# Patient Record
Sex: MALE | Race: BLACK OR AFRICAN AMERICAN | NOT HISPANIC OR LATINO
[De-identification: names, ages, dates, MRNs, and addresses within clinical notes are randomized per-mention and may not be internally consistent; named-entity substitution may affect disease eponyms.]

---

## 2017-08-10 ENCOUNTER — APPOINTMENT (OUTPATIENT)
Dept: POPULATION HEALTH | Facility: CLINIC | Age: 37
End: 2017-08-10
Payer: OTHER MISCELLANEOUS

## 2017-08-10 VITALS
HEIGHT: 73.5 IN | TEMPERATURE: 98.9 F | DIASTOLIC BLOOD PRESSURE: 93 MMHG | SYSTOLIC BLOOD PRESSURE: 126 MMHG | RESPIRATION RATE: 19 BRPM | OXYGEN SATURATION: 96 % | WEIGHT: 275 LBS | BODY MASS INDEX: 35.67 KG/M2 | HEART RATE: 100 BPM

## 2017-08-10 DIAGNOSIS — R40.0 SOMNOLENCE: ICD-10-CM

## 2017-08-10 DIAGNOSIS — Z78.9 OTHER SPECIFIED HEALTH STATUS: ICD-10-CM

## 2017-08-10 DIAGNOSIS — M25.50 PAIN IN UNSPECIFIED JOINT: ICD-10-CM

## 2017-08-10 DIAGNOSIS — F32.9 MAJOR DEPRESSIVE DISORDER, SINGLE EPISODE, UNSPECIFIED: ICD-10-CM

## 2017-08-10 DIAGNOSIS — G47.9 SLEEP DISORDER, UNSPECIFIED: ICD-10-CM

## 2017-08-10 DIAGNOSIS — Z82.61 FAMILY HISTORY OF ARTHRITIS: ICD-10-CM

## 2017-08-10 PROBLEM — Z00.00 ENCOUNTER FOR PREVENTIVE HEALTH EXAMINATION: Status: ACTIVE | Noted: 2017-08-10

## 2017-08-10 PROCEDURE — 99205 OFFICE O/P NEW HI 60 MIN: CPT

## 2017-08-10 RX ORDER — KETOCONAZOLE 20 MG/G
2 CREAM TOPICAL
Qty: 120 | Refills: 0 | Status: ACTIVE | COMMUNITY
Start: 2017-04-10

## 2017-08-25 ENCOUNTER — TRANSCRIPTION ENCOUNTER (OUTPATIENT)
Age: 37
End: 2017-08-25

## 2017-10-12 ENCOUNTER — APPOINTMENT (OUTPATIENT)
Dept: POPULATION HEALTH | Facility: CLINIC | Age: 37
End: 2017-10-12
Payer: OTHER MISCELLANEOUS

## 2017-10-12 VITALS
BODY MASS INDEX: 35.15 KG/M2 | HEIGHT: 73.5 IN | RESPIRATION RATE: 20 BRPM | SYSTOLIC BLOOD PRESSURE: 134 MMHG | DIASTOLIC BLOOD PRESSURE: 99 MMHG | HEART RATE: 50 BPM | OXYGEN SATURATION: 96 % | WEIGHT: 271 LBS | TEMPERATURE: 98.9 F

## 2017-10-12 DIAGNOSIS — M75.112 INCOMPLETE ROTATOR CUFF TEAR OR RUPTURE OF LEFT SHOULDER, NOT SPECIFIED AS TRAUMATIC: ICD-10-CM

## 2017-10-12 PROCEDURE — 99214 OFFICE O/P EST MOD 30 MIN: CPT

## 2017-12-07 ENCOUNTER — APPOINTMENT (OUTPATIENT)
Dept: POPULATION HEALTH | Facility: CLINIC | Age: 37
End: 2017-12-07
Payer: OTHER MISCELLANEOUS

## 2017-12-07 VITALS
HEIGHT: 73.5 IN | SYSTOLIC BLOOD PRESSURE: 130 MMHG | TEMPERATURE: 98.2 F | WEIGHT: 272 LBS | OXYGEN SATURATION: 97 % | BODY MASS INDEX: 35.28 KG/M2 | HEART RATE: 85 BPM | RESPIRATION RATE: 18 BRPM | DIASTOLIC BLOOD PRESSURE: 87 MMHG

## 2017-12-07 PROCEDURE — 99214 OFFICE O/P EST MOD 30 MIN: CPT

## 2018-01-03 ENCOUNTER — EMERGENCY (EMERGENCY)
Facility: HOSPITAL | Age: 38
LOS: 1 days | Discharge: HOME | End: 2018-01-03
Attending: EMERGENCY MEDICINE | Admitting: EMERGENCY MEDICINE
Payer: SELF-PAY

## 2018-01-03 VITALS
OXYGEN SATURATION: 97 % | DIASTOLIC BLOOD PRESSURE: 78 MMHG | HEART RATE: 76 BPM | RESPIRATION RATE: 20 BRPM | TEMPERATURE: 98 F | SYSTOLIC BLOOD PRESSURE: 132 MMHG

## 2018-01-03 VITALS
HEART RATE: 102 BPM | DIASTOLIC BLOOD PRESSURE: 93 MMHG | OXYGEN SATURATION: 96 % | TEMPERATURE: 98 F | SYSTOLIC BLOOD PRESSURE: 152 MMHG | RESPIRATION RATE: 20 BRPM

## 2018-01-03 DIAGNOSIS — M79.604 PAIN IN RIGHT LEG: ICD-10-CM

## 2018-01-03 DIAGNOSIS — M54.2 CERVICALGIA: ICD-10-CM
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
## 2018-01-03 DIAGNOSIS — R51 HEADACHE: ICD-10-CM
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
## 2018-01-03 PROCEDURE — 71045 X-RAY EXAM CHEST 1 VIEW: CPT | Mod: 26

## 2018-01-03 PROCEDURE — 73610 X-RAY EXAM OF ANKLE: CPT | Mod: 26,RT

## 2018-01-03 PROCEDURE — 71045 X-RAY EXAM CHEST 1 VIEW: CPT

## 2018-01-03 PROCEDURE — 72125 CT NECK SPINE W/O DYE: CPT

## 2018-01-03 PROCEDURE — 99285 EMERGENCY DEPT VISIT HI MDM: CPT

## 2018-01-03 PROCEDURE — 73110 X-RAY EXAM OF WRIST: CPT

## 2018-01-03 PROCEDURE — 72125 CT NECK SPINE W/O DYE: CPT | Mod: 26

## 2018-01-03 PROCEDURE — 70450 CT HEAD/BRAIN W/O DYE: CPT

## 2018-01-03 PROCEDURE — 73590 X-RAY EXAM OF LOWER LEG: CPT

## 2018-01-03 PROCEDURE — 73130 X-RAY EXAM OF HAND: CPT

## 2018-01-03 PROCEDURE — 73080 X-RAY EXAM OF ELBOW: CPT

## 2018-01-03 PROCEDURE — 73110 X-RAY EXAM OF WRIST: CPT | Mod: 26,RT

## 2018-01-03 PROCEDURE — 73562 X-RAY EXAM OF KNEE 3: CPT

## 2018-01-03 PROCEDURE — 73562 X-RAY EXAM OF KNEE 3: CPT | Mod: 26,RT

## 2018-01-03 PROCEDURE — 73590 X-RAY EXAM OF LOWER LEG: CPT | Mod: 26,RT

## 2018-01-03 PROCEDURE — 70450 CT HEAD/BRAIN W/O DYE: CPT | Mod: 26

## 2018-01-03 PROCEDURE — 72170 X-RAY EXAM OF PELVIS: CPT | Mod: 26

## 2018-01-03 PROCEDURE — 73080 X-RAY EXAM OF ELBOW: CPT | Mod: 26,RT

## 2018-01-03 PROCEDURE — 99284 EMERGENCY DEPT VISIT MOD MDM: CPT | Mod: 25

## 2018-01-03 PROCEDURE — 73610 X-RAY EXAM OF ANKLE: CPT

## 2018-01-03 PROCEDURE — 73130 X-RAY EXAM OF HAND: CPT | Mod: 26,RT

## 2018-01-03 PROCEDURE — 72170 X-RAY EXAM OF PELVIS: CPT

## 2018-01-03 RX ORDER — ACETAMINOPHEN 500 MG
650 TABLET ORAL ONCE
Refills: 0 | Status: COMPLETED | OUTPATIENT
Start: 2018-01-03 | End: 2018-01-03

## 2018-01-03 RX ADMIN — Medication 650 MILLIGRAM(S): at 09:11
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
## 2018-01-03 NOTE — ED PROVIDER NOTE - PLAN OF CARE
Your x-rays and CAT scans show no internal injuries.    Return for worsening pain or any other concerns    You may use Tylenol 650mg every 8 hours or Motrin 600mg every 8 hours as needed for pain.
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
## 2018-01-03 NOTE — ED PROVIDER NOTE - ATTENDING CONTRIBUTION TO CARE
Attending MD Sierra:  I personally have seen and examined this patient.  Resident note reviewed and agree on plan of care and except where noted.  See HPI, PE, and MDM for details.
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
## 2018-03-12 ENCOUNTER — APPOINTMENT (OUTPATIENT)
Dept: POPULATION HEALTH | Facility: CLINIC | Age: 38
End: 2018-03-12

## 2023-04-04 ENCOUNTER — EMERGENCY (EMERGENCY)
Facility: HOSPITAL | Age: 43
LOS: 1 days | Discharge: HOME | End: 2023-04-04
Attending: EMERGENCY MEDICINE
Payer: COMMERCIAL

## 2023-04-04 VITALS
HEART RATE: 86 BPM | WEIGHT: 300.05 LBS | DIASTOLIC BLOOD PRESSURE: 111 MMHG | HEIGHT: 75 IN | RESPIRATION RATE: 20 BRPM | OXYGEN SATURATION: 96 % | TEMPERATURE: 98 F | SYSTOLIC BLOOD PRESSURE: 155 MMHG

## 2023-04-04 VITALS
HEART RATE: 76 BPM | TEMPERATURE: 98 F | DIASTOLIC BLOOD PRESSURE: 96 MMHG | OXYGEN SATURATION: 100 % | SYSTOLIC BLOOD PRESSURE: 137 MMHG | RESPIRATION RATE: 17 BRPM

## 2023-04-04 LAB
ALBUMIN SERPL ELPH-MCNC: 4.1 G/DL — SIGNIFICANT CHANGE UP (ref 3.3–5)
ALP SERPL-CCNC: 66 U/L — SIGNIFICANT CHANGE UP (ref 40–120)
ALT FLD-CCNC: 24 U/L — SIGNIFICANT CHANGE UP (ref 10–45)
ANION GAP SERPL CALC-SCNC: 10 MMOL/L — SIGNIFICANT CHANGE UP (ref 5–17)
AST SERPL-CCNC: 17 U/L — SIGNIFICANT CHANGE UP (ref 10–40)
BASOPHILS # BLD AUTO: 0.01 K/UL — SIGNIFICANT CHANGE UP (ref 0–0.2)
BASOPHILS NFR BLD AUTO: 0.1 % — SIGNIFICANT CHANGE UP (ref 0–2)
BILIRUB SERPL-MCNC: 0.1 MG/DL — LOW (ref 0.2–1.2)
BUN SERPL-MCNC: 16 MG/DL — SIGNIFICANT CHANGE UP (ref 7–23)
CALCIUM SERPL-MCNC: 9 MG/DL — SIGNIFICANT CHANGE UP (ref 8.4–10.5)
CHLORIDE SERPL-SCNC: 106 MMOL/L — SIGNIFICANT CHANGE UP (ref 96–108)
CO2 SERPL-SCNC: 25 MMOL/L — SIGNIFICANT CHANGE UP (ref 22–31)
CREAT SERPL-MCNC: 1.12 MG/DL — SIGNIFICANT CHANGE UP (ref 0.5–1.3)
EGFR: 84 ML/MIN/1.73M2 — SIGNIFICANT CHANGE UP
EOSINOPHIL # BLD AUTO: 0.25 K/UL — SIGNIFICANT CHANGE UP (ref 0–0.5)
EOSINOPHIL NFR BLD AUTO: 3.2 % — SIGNIFICANT CHANGE UP (ref 0–6)
GLUCOSE SERPL-MCNC: 112 MG/DL — HIGH (ref 70–99)
HCT VFR BLD CALC: 44.5 % — SIGNIFICANT CHANGE UP (ref 39–50)
HGB BLD-MCNC: 14.1 G/DL — SIGNIFICANT CHANGE UP (ref 13–17)
IMM GRANULOCYTES NFR BLD AUTO: 0.3 % — SIGNIFICANT CHANGE UP (ref 0–0.9)
LYMPHOCYTES # BLD AUTO: 2.44 K/UL — SIGNIFICANT CHANGE UP (ref 1–3.3)
LYMPHOCYTES # BLD AUTO: 31.4 % — SIGNIFICANT CHANGE UP (ref 13–44)
MCHC RBC-ENTMCNC: 28.8 PG — SIGNIFICANT CHANGE UP (ref 27–34)
MCHC RBC-ENTMCNC: 31.7 GM/DL — LOW (ref 32–36)
MCV RBC AUTO: 90.8 FL — SIGNIFICANT CHANGE UP (ref 80–100)
MONOCYTES # BLD AUTO: 0.76 K/UL — SIGNIFICANT CHANGE UP (ref 0–0.9)
MONOCYTES NFR BLD AUTO: 9.8 % — SIGNIFICANT CHANGE UP (ref 2–14)
NEUTROPHILS # BLD AUTO: 4.29 K/UL — SIGNIFICANT CHANGE UP (ref 1.8–7.4)
NEUTROPHILS NFR BLD AUTO: 55.2 % — SIGNIFICANT CHANGE UP (ref 43–77)
NRBC # BLD: 0 /100 WBCS — SIGNIFICANT CHANGE UP (ref 0–0)
PLATELET # BLD AUTO: 212 K/UL — SIGNIFICANT CHANGE UP (ref 150–400)
POTASSIUM SERPL-MCNC: 3.9 MMOL/L — SIGNIFICANT CHANGE UP (ref 3.5–5.3)
POTASSIUM SERPL-SCNC: 3.9 MMOL/L — SIGNIFICANT CHANGE UP (ref 3.5–5.3)
PROT SERPL-MCNC: 7.1 G/DL — SIGNIFICANT CHANGE UP (ref 6–8.3)
RBC # BLD: 4.9 M/UL — SIGNIFICANT CHANGE UP (ref 4.2–5.8)
RBC # FLD: 14.6 % — HIGH (ref 10.3–14.5)
SODIUM SERPL-SCNC: 141 MMOL/L — SIGNIFICANT CHANGE UP (ref 135–145)
TROPONIN T, HIGH SENSITIVITY RESULT: <6 NG/L — SIGNIFICANT CHANGE UP (ref 0–51)
WBC # BLD: 7.77 K/UL — SIGNIFICANT CHANGE UP (ref 3.8–10.5)
WBC # FLD AUTO: 7.77 K/UL — SIGNIFICANT CHANGE UP (ref 3.8–10.5)

## 2023-04-04 PROCEDURE — 84484 ASSAY OF TROPONIN QUANT: CPT

## 2023-04-04 PROCEDURE — 80053 COMPREHEN METABOLIC PANEL: CPT

## 2023-04-04 PROCEDURE — 71045 X-RAY EXAM CHEST 1 VIEW: CPT

## 2023-04-04 PROCEDURE — 36415 COLL VENOUS BLD VENIPUNCTURE: CPT

## 2023-04-04 PROCEDURE — 99285 EMERGENCY DEPT VISIT HI MDM: CPT | Mod: 25

## 2023-04-04 PROCEDURE — 85025 COMPLETE CBC W/AUTO DIFF WBC: CPT

## 2023-04-04 PROCEDURE — 71045 X-RAY EXAM CHEST 1 VIEW: CPT | Mod: 26

## 2023-04-04 PROCEDURE — 99285 EMERGENCY DEPT VISIT HI MDM: CPT

## 2023-04-04 PROCEDURE — 93005 ELECTROCARDIOGRAM TRACING: CPT

## 2023-04-04 NOTE — ED ADULT NURSE NOTE - OBJECTIVE STATEMENT
41 y/o male denies significant PMH, presents to ED c/o chest pain. Pt states their chest pain began a couple weeks ago, cp is intermittent, when at its worst, cp feel "tightening". Currently endorsing 2/10 cp. Pt endorses tightening cp happens when their "kneeling down". Pt is A&OX4, follows commands, breathing spontaneous and unlabored, on cardiac monitor NSR, abdomen non-tender, able to move all extremities, skin warm and dry. Pt denies SOB, cp, abd pain, N&V, weakness, dizziness, recent fevers and chills, and HA. Comfort and safety measure sin place, bed in lowest position, side rails up and wheels locked.

## 2023-04-04 NOTE — ED PROVIDER NOTE - NSFOLLOWUPINSTRUCTIONS_ED_ALL_ED_FT
While we do not know the exact cause of your chest pain, your workup today did not show any dangerous causes of it and we feel it is safe for you to go home.    However, there is always a chance that you are developing something we were unable to identify today. If your symptoms worsen, particularly if you have severe pain, trouble breathing, persistent vomiting, high fevers, if you pass out, or have any other major concern, come right back to the ED for further treatment.    Call your primary doctor to schedule follow up.

## 2023-04-04 NOTE — ED PROVIDER NOTE - PATIENT PORTAL LINK FT
You can access the FollowMyHealth Patient Portal offered by [facility] by registering at the following website: [email or web address]. By joining [employer]’s FollowMyHealth portal, you will also be able to view your health information using other applications (apps) compatible with our system.
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
## 2023-04-04 NOTE — ED PROVIDER NOTE - OBJECTIVE STATEMENT
42-year-old male no past medical history presenting with a chief complaint of chest pain.  Patient states chest pain began a few weeks ago but then went away.  Patient states that chest pain been began last night.  Took 4 baby aspirin with mild symptom relief.  Patient describes pain as tightening and positional.  States initially the pain lasted for about 2 to 20 minutes and then wore off but still has residual pain.  Patient localizes pain to the center of chest.  Patient denies fevers, chills, nausea, vomiting, shortness of breath, headache or visual changes.

## 2023-04-04 NOTE — ED PROVIDER NOTE - CLINICAL SUMMARY MEDICAL DECISION MAKING FREE TEXT BOX
42-year-old male no past medical history presenting with a chief complaint of chest pain. Patient denies fevers, chills, nausea, vomiting, shortness of breath, headache or visual changes. VS. stable. PE. unremarkable. Will assess for ACS concern not limited to myocarditis vs PNA vs msk pain. Will get cardiac labs, chest xray and ekg.

## 2023-04-04 NOTE — ED ADULT TRIAGE NOTE - CHIEF COMPLAINT QUOTE
intermittent, mid-sternal chest pain beginning few weeks prior, states pain improves upon positioning at times.  Denies SOB.

## 2023-04-04 NOTE — ED PROVIDER NOTE - PROGRESS NOTE DETAILS
workup unremarkable. On re-evaluation, pt is well appearing, stable vitals, tolerating PO, ambulatory. Patient is safe for d/c with supportive care, return precautions, and outpt f/u w cards if symptoms persist.

## 2023-12-24 ENCOUNTER — EMERGENCY (EMERGENCY)
Facility: HOSPITAL | Age: 43
LOS: 0 days | Discharge: HOME | End: 2023-12-24
Attending: EMERGENCY MEDICINE
Payer: COMMERCIAL

## 2023-12-24 VITALS
DIASTOLIC BLOOD PRESSURE: 113 MMHG | RESPIRATION RATE: 20 BRPM | OXYGEN SATURATION: 98 % | SYSTOLIC BLOOD PRESSURE: 158 MMHG | HEART RATE: 82 BPM | TEMPERATURE: 98 F

## 2023-12-24 VITALS
DIASTOLIC BLOOD PRESSURE: 100 MMHG | HEART RATE: 79 BPM | RESPIRATION RATE: 18 BRPM | SYSTOLIC BLOOD PRESSURE: 147 MMHG | OXYGEN SATURATION: 98 % | TEMPERATURE: 99 F

## 2023-12-24 DIAGNOSIS — M79.661 PAIN IN RIGHT LOWER LEG: ICD-10-CM
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
## 2023-12-24 DIAGNOSIS — S80.11XA CONTUSION OF RIGHT LOWER LEG, INITIAL ENCOUNTER: ICD-10-CM

## 2023-12-24 PROCEDURE — 73590 X-RAY EXAM OF LOWER LEG: CPT | Mod: RT

## 2023-12-24 PROCEDURE — 93971 EXTREMITY STUDY: CPT | Mod: 26,RT

## 2023-12-24 PROCEDURE — 93971 EXTREMITY STUDY: CPT | Mod: RT

## 2023-12-24 PROCEDURE — 73590 X-RAY EXAM OF LOWER LEG: CPT | Mod: 26,RT

## 2023-12-24 PROCEDURE — 99284 EMERGENCY DEPT VISIT MOD MDM: CPT | Mod: 25

## 2023-12-24 PROCEDURE — 99284 EMERGENCY DEPT VISIT MOD MDM: CPT

## 2023-12-24 NOTE — ED STATDOCS - PROVIDER TOKENS
PROVIDER:[TOKEN:[[id number]],FOLLOWUP:[Routine]] PROVIDER:[TOKEN:[[id number]],FOLLOWUP:[Routine]]

## 2023-12-24 NOTE — ED STATDOCS - CARE PROVIDERS DIRECT ADDRESSES
[email or web address]

## 2023-12-24 NOTE — ED STATDOCS - PATIENT PORTAL LINK FT
You can access the FollowMyHealth Patient Portal offered by [facility] by registering at the following website: [email or web address]. By joining [employer]’s FollowMyHealth portal, you will also be able to view your health information using other applications (apps) compatible with our system. You can access the FollowMyHealth Patient Portal offered by [facility] by registering at the following website: [email or web address]. By joining [facility]’s FollowMyHealth portal, you will also be able to view your health information using other applications (apps) compatible with our system.

## 2023-12-24 NOTE — ED ADULT NURSE NOTE - OBJECTIVE STATEMENT
Patient ambulatory to the ER c/o right lower leg injury. Patient reports coming down off ladder and leg slipped through the rung. Bruising noted to right lower leg. Denies fall off ladder. Doppler and xrays negative. Pt cleared for dc home with ortho follow up

## 2023-12-24 NOTE — ED STATDOCS - MUSCULOSKELETAL, MLM
range of motion is not limited and there is no muscle tenderness. RIGHT LEG: +Mild STS/ecchymosis right lateral shin area. FROM. No bony deformity. No calf tenderness. Able to fully bear weight. 2+ distal pulses.

## 2023-12-24 NOTE — ED ADULT TRIAGE NOTE - CHIEF COMPLAINT QUOTE
Patient ambulatory to the ER c/o right lower leg injury. Patient reports coming down off ladder and leg slipped through the rung. Bruising noted to right lower leg. Denies fall off ladder.

## 2023-12-24 NOTE — ED STATDOCS - CLINICAL SUMMARY MEDICAL DECISION MAKING FREE TEXT BOX
Pt able to walk on right leg. Leg swelling more likely contusion from trauma from ladder. However, will get XR to r/o fx and US due to swelling to r/o DVT. Pt able to walk on right leg. Leg swelling more likely contusion from trauma from ladder. However, will get XR to r/o fx and US due to swelling to r/o DVT.    PA note: Xrays NEG for fracture. RLE Doppler NEG for DVT. All labwork/radiology results discussed in detail with patient. Patient re-examined and re-evaluated. Patient feels much better at this time. ED evaluation, Diagnosis and management discussed with the patient in detail. Workup results discussed with ED attending, OK to dc home. Close PMD follow up encouraged, aftercare to assist with scheduling appointment ASAP. Strict ED return instructions discussed in detail and patient given the opportunity to ask any questions about their discharge diagnosis and instructions. Patient verbalized understanding. ~ Phill Huffman PA-C

## 2023-12-24 NOTE — ED STATDOCS - ATTENDING APP SHARED VISIT CONTRIBUTION OF CARE
Dr. Morris: I performed a face to face bedside interview with patient regarding history of present illness, review of symptoms and past medical history. I completed an independent physical exam.  I have discussed patient's plan of care with PA.   I agree with note as stated above, having amended the EMR as needed to reflect my findings.   This includes HISTORY OF PRESENT ILLNESS, HIV, PAST MEDICAL/SURGICAL/FAMILY/SOCIAL HISTORY, ALLERGIES AND HOME MEDICATIONS, REVIEW OF SYSTEMS, PHYSICAL EXAM, and any PROGRESS NOTES during the time I functioned as the attending physician for this patient.  ALONDRA Morris DO

## 2023-12-24 NOTE — ED STATDOCS - PROGRESS NOTE DETAILS
PA: Patient is a 42 y/o male with no pertinent PMHx who presents to [facility] c/o right lower leg injury. Patient was coming down ladder and leg slipped through the rung 5 days ago and patient hit his right shin on ladder. Patient is able to fully bear weight without difficulty. ~Phill Huffman PA-C PA: Patient is a 42 y/o male with no pertinent PMHx who presents to [facility] c/o right lower leg injury. Patient was coming down ladder and leg slipped through the rung 5 days ago and patient hit his right shin on ladder. Patient is able to fully bear weight without difficulty. ~Phill Huffman PA-C PA note: Xrays NEG for fracture. RLE Doppler NEG for DVT. All labwork/radiology results discussed in detail with patient. Patient re-examined and re-evaluated. Patient feels much better at this time. ED evaluation, Diagnosis and management discussed with the patient in detail. Workup results discussed with ED attending, OK to dc home. Close PMD follow up encouraged, aftercare to assist with scheduling appointment ASAP. Strict ED return instructions discussed in detail and patient given the opportunity to ask any questions about their discharge diagnosis and instructions. Patient verbalized understanding. ~ Phill Huffman PA-C

## 2023-12-24 NOTE — ED STATDOCS - CARE PROVIDER_API CALL
Hemal Villa  Orthopaedic Surgery  [address] [place] [id number]  Phone: [phone]  Fax: [phone]  Follow Up Time: Routine   Hemal Villa  Orthopaedic Surgery  [address] [place] [id number]  Phone: [phone]  Fax: [phone]  Follow Up Time: Routine

## 2023-12-24 NOTE — ED STATDOCS - PHYSICAL EXAMINATION
Gen:  Well appearing in NAD  Head:  NC/AT  HEENT: pupils perrl, no pharyngeal erythema, uvula midline  Cardiac: S1S2, RRR  Abd: Soft, non tender  Resp: No distress, CTA   musculoskeletal: +right lower leg and foot swollen, ecchymosis purple/red color from proximal anterior tibial medially to the foot.   Skin: warm and dry as visualized, no rashes  Neuro: MORROW, aao x 4  Psych: alert, cooperative, appropriate mood and affect for situation Attending: Gen:  Well appearing in NAD  Head:  NC/AT  HEENT: pupils perrl, no pharyngeal erythema, uvula midline  Cardiac: S1S2, RRR  Abd: Soft, non tender  Resp: No distress, CTA   musculoskeletal: +right lower leg and foot swollen, ecchymosis purple/red color from proximal anterior tibial medially to the foot.   Skin: warm and dry as visualized, no rashes  Neuro: MORROW, aao x 4  Psych: alert, cooperative, appropriate mood and affect for situation

## 2023-12-24 NOTE — ED STATDOCS - OBJECTIVE STATEMENT
44 y/o male with no pertinent PMHx presents to ED c/o right lower leg pain/injury s/p coming down ladder and leg slipped through the rung 5 days ago. Has been able to ambulate since incident. Denies use of anticoagulations. 42 y/o male with no pertinent PMHx presents to ED c/o right lower leg pain/injury s/p coming down ladder and leg slipped through the rung 5 days ago. Has been able to ambulate since incident. Denies use of anticoagulations.

## 2024-01-08 ENCOUNTER — OUTPATIENT (OUTPATIENT)
Dept: OUTPATIENT SERVICES | Facility: HOSPITAL | Age: 44
LOS: 1 days | End: 2024-01-08
Payer: COMMERCIAL

## 2024-01-08 ENCOUNTER — APPOINTMENT (OUTPATIENT)
Dept: ULTRASOUND IMAGING | Facility: CLINIC | Age: 44
End: 2024-01-08
Payer: COMMERCIAL

## 2024-01-08 DIAGNOSIS — Z00.8 ENCOUNTER FOR OTHER GENERAL EXAMINATION: ICD-10-CM

## 2024-01-08 PROCEDURE — 93971 EXTREMITY STUDY: CPT

## 2024-01-08 PROCEDURE — 93971 EXTREMITY STUDY: CPT | Mod: 26,RT
